# Patient Record
Sex: MALE | Race: ASIAN | NOT HISPANIC OR LATINO | URBAN - METROPOLITAN AREA
[De-identification: names, ages, dates, MRNs, and addresses within clinical notes are randomized per-mention and may not be internally consistent; named-entity substitution may affect disease eponyms.]

---

## 2020-07-20 ENCOUNTER — EMERGENCY (EMERGENCY)
Facility: HOSPITAL | Age: 45
LOS: 1 days | Discharge: HOME | End: 2020-07-20
Attending: EMERGENCY MEDICINE
Payer: SELF-PAY

## 2020-07-20 VITALS
RESPIRATION RATE: 18 BRPM | TEMPERATURE: 98 F | HEART RATE: 78 BPM | SYSTOLIC BLOOD PRESSURE: 146 MMHG | OXYGEN SATURATION: 100 % | DIASTOLIC BLOOD PRESSURE: 95 MMHG

## 2020-07-20 VITALS
DIASTOLIC BLOOD PRESSURE: 98 MMHG | WEIGHT: 199.96 LBS | SYSTOLIC BLOOD PRESSURE: 157 MMHG | RESPIRATION RATE: 18 BRPM | HEART RATE: 71 BPM | OXYGEN SATURATION: 98 % | TEMPERATURE: 98 F

## 2020-07-20 PROCEDURE — 12011 RPR F/E/E/N/L/M 2.5 CM/<: CPT

## 2020-07-20 PROCEDURE — 99282 EMERGENCY DEPT VISIT SF MDM: CPT

## 2020-07-20 PROCEDURE — 99282 EMERGENCY DEPT VISIT SF MDM: CPT | Mod: 25

## 2020-07-20 NOTE — ED ADULT NURSE NOTE - CHPI ED NUR SYMPTOMS NEG
no chills/no pain/no rectal pain/no purulent drainage/no redness/no blood in mucus/no bleeding at site/no drainage/no fever/no vomiting

## 2020-07-20 NOTE — ED PROVIDER NOTE - PATIENT PORTAL LINK FT
You can access the FollowMyHealth Patient Portal offered by [facility] by registering at the following website: [email or web address]. By joining [employer]’s FollowMyHealth portal, you will also be able to view your health information using other applications (apps) compatible with our system.

## 2020-07-20 NOTE — ED ADULT TRIAGE NOTE - CHIEF COMPLAINT QUOTE
as per the pt " I got hit to the forehead with trolly  cut present to the forhead " not on blood thinners

## 2020-07-20 NOTE — ED ADULT NURSE NOTE - OBJECTIVE STATEMENT
pt presents to ed with laceration to forehead/ above L eyebrow, pt states he was hit in forehead by a trolley which then resulted in the lac and some bleeding. currently not bleeding at this time. Denies LOC, n/v/, dizziness, falls. NAD.

## 2020-07-20 NOTE — ED PROVIDER NOTE - OBJECTIVE STATEMENT
46 y/o M with no significant PMHx/PSHx presents to the ED with small laceration to forehead after getting bumped with a trolley. Patient was helping somebody pushing a trolley when it bounced off a wall and hit his head. Denies LOC, hitting the floor or any other acute complaints. Tetanus up to date.
